# Patient Record
Sex: FEMALE | URBAN - METROPOLITAN AREA
[De-identification: names, ages, dates, MRNs, and addresses within clinical notes are randomized per-mention and may not be internally consistent; named-entity substitution may affect disease eponyms.]

---

## 2019-08-14 ENCOUNTER — NURSE TRIAGE (OUTPATIENT)
Dept: NURSING | Facility: CLINIC | Age: 74
End: 2019-08-14

## 2019-08-14 NOTE — TELEPHONE ENCOUNTER
"Patient calling reporting she fell 3 weeks ago on her  left shoulder. Reporting a few days after \"left sided neck pain\" started radiating down left arm. Pain has been increasing with difficulty sleeping x 2 nights. Rating pain \"8\" at rest. Pain increases with movement of arm shoulder. Patient is taking Aleve for pain control. Per guidelines advised to be seen with in 4 hours. Caller verbalized understanding. Denies further questions.    Sunni Calvillo RN  [facility]        Reason for Disposition    SEVERE neck pain (e.g., excruciating)    Additional Information    Negative: Dangerous mechanism of injury (e.g., MVA, contact sports, diving, fall on trampoline, fall > 10 feet or 3 meters) (Exception: neck pain began > 1 hour after injury)    Negative: Weakness of arms or legs    Negative: Numbness, tingling, or burning of arms, upper back/chest or legs (Exception: brief, now gone)    Negative: Major bleeding (e.g., actively dripping or spurting)    Negative: Bullet wound, knife wound, or other penetrating object    Negative: Difficulty breathing (e.g., choking or stridor)    Negative: Knocked out (unconscious from head injury) > 1 minute    Negative: [1] Direct blow to front of neck AND [2] cough, hoarseness, abnormal voice, or can't talk    Negative: Sounds like a life-threatening emergency to the triager    Negative: [1] Injuries at more than 1 site AND [2] unsure which guideline to use    Negative: Neck pain not from an injury    Negative: [1] Dangerous mechanism of injury (e.g., MVA, contact sports, diving,  fall on trampoline, fall > 10 feet or 3 meters) AND [2] neck pain or stiffness began > 1 hour after injury    Negative: [1] Numbness, tingling, or burning of arms, upper back/chest or legs AND [2] not present now (i.e., completely resolved)    Negative: Coughing up blood    Negative: Difficulty swallowing or drooling    Negative: Skin is split open or gaping  (or length > 1/2 inch or 12 mm)    " Negative: Puncture wound of neck    Negative: [1] Bleeding AND [2] won't stop after 10 minutes of direct pressure (using correct technique)    Negative: Large swelling or bruise > 2 inches (5 cm)    Negative: Sounds like a serious injury to the triager    Protocols used: NECK INJURY-A-AH